# Patient Record
(demographics unavailable — no encounter records)

---

## 2024-11-13 NOTE — HEALTH RISK ASSESSMENT
[Good] : ~his/her~  mood as  good [No] : No [No falls in past year] : Patient reported no falls in the past year [0] : 2) Feeling down, depressed, or hopeless: Not at all (0) [Never] : Never [Patient reported mammogram was normal] : Patient reported mammogram was normal [Patient reported colonoscopy was normal] : Patient reported colonoscopy was normal [Hepatitis C test offered] : Hepatitis C test offered [With Family] : lives with family [Employed] : employed [Feels Safe at Home] : Feels safe at home [Fully functional (bathing, dressing, toileting, transferring, walking, feeding)] : Fully functional (bathing, dressing, toileting, transferring, walking, feeding) [Fully functional (using the telephone, shopping, preparing meals, housekeeping, doing laundry, using] : Fully functional and needs no help or supervision to perform IADLs (using the telephone, shopping, preparing meals, housekeeping, doing laundry, using transportation, managing medications and managing finances) [Patient/Caregiver not ready to engage] : , patient/caregiver not ready to engage [Audit-CScore] : 0 [XRQ7Sxley] : 0 [Reports changes in hearing] : Reports no changes in hearing [Reports changes in vision] : Reports no changes in vision [MammogramDate] : 2023 [MammogramComments] : Normal, per pt  [ColonoscopyDate] : 02/2024 [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

## 2024-11-13 NOTE — COUNSELING
[Fall prevention counseling provided] : Fall prevention counseling provided [Adequate lighting] : Adequate lighting [Behavioral health counseling provided] : Behavioral health counseling provided [Engage in a relaxing activity] : Engage in a relaxing activity [Plan in advance] : Plan in advance

## 2024-11-13 NOTE — INTERPRETER SERVICES
[[employer] ] : provided by [employer]   [Time Spent: ____ minutes] : Total time spent using  services: [unfilled] minutes. The patient's primary language is not English thus required  services. [Interpreters_IDNumber] : [id number]

## 2024-11-13 NOTE — HISTORY OF PRESENT ILLNESS
[FreeTextEntry1] : CPE [de-identified] : 50 yrs old female w/o Phx presenting for annual PE. C/o right lower back pain for 1 month. It comes and goes. pain scale 6/10. Sometimes, it liked a shooting pain. Tylenol helped. Pt has concerns she has kidney problem and would like to have further exam.

## 2024-11-13 NOTE — PHYSICAL EXAM
[Normal] : affect was normal and insight and judgment were intact [de-identified] : R CVA positive ?

## 2024-11-13 NOTE — PLAN
[FreeTextEntry1] : # Gerry PE -CBC, CMP, UA, EKG, lipid panel, hep C - Flu vaccine  # Right lower back pain  Muscular pain Vs. kidney stone  - renal u/s to r/o stone -meloxicam 7.5mg qd PRN for back pain

## 2024-12-20 NOTE — PHYSICAL EXAM
[Normal] : affect was normal and insight and judgment were intact [de-identified] : Tonsile red and swollen

## 2024-12-20 NOTE — HEALTH RISK ASSESSMENT
[No] : No [1 or 2 (0 pts)] : 1 or 2 (0 points) [Never (0 pts)] : Never (0 points) [No falls in past year] : Patient reported no falls in the past year [0] : 2) Feeling down, depressed, or hopeless: Not at all (0) [Patient/Caregiver not ready to engage] : , patient/caregiver not ready to engage [Never] : Never [Audit-CScore] : 0 [[id number]] : 0

## 2024-12-20 NOTE — INTERPRETER SERVICES
[[employer] ] : provided by [employer]   [Time Spent: ____ minutes] : Total time spent using  services: [unfilled] minutes. The patient's primary language is not English thus required  services. [Interpreters_IDNumber] : [id number]

## 2024-12-20 NOTE — REVIEW OF SYSTEMS
[Sore Throat] : sore throat [Negative] : Heme/Lymph [Nasal Discharge] : no nasal discharge [FreeTextEntry4] : clear phlegm

## 2024-12-20 NOTE — HISTORY OF PRESENT ILLNESS
[FreeTextEntry1] : Cough, throat pain for 10 days  [de-identified] : 51 yrs old female w/o Hx presenting to office for cough, throat pain for 10days. Pt had flu vaccine last month. Per pt, she started having cough, throat pain. Denies fever/chill/sob/anyone sick at home. Took Mucinex and Tylenol which did not help with cough. Coming to office for further exam.  - Received flu vaccine last month

## 2024-12-20 NOTE — COUNSELING
[Fall prevention counseling provided] : Fall prevention counseling provided [Adequate lighting] : Adequate lighting [Encouraged to maintain food diary] : Encouraged to maintain food diary [Encouraged to increase physical activity] : Encouraged to increase physical activity [Encouraged to use exercise tracking device] : Encouraged to use exercise tracking device

## 2024-12-20 NOTE — PLAN
[FreeTextEntry1] : # Upper air way infection  - RVP including covid test  - S/p flu vaccine last month  - Doxycycline 100mg qd x7 days  - Bromfed 10ml q4hrs PRN

## 2025-03-14 NOTE — HISTORY OF PRESENT ILLNESS
[FreeTextEntry1] : follow up fatty liver [de-identified] : follow up fatty liver feels some sob no nvd  or palpiations no family history of ashd

## 2025-03-14 NOTE — ASSESSMENT
[FreeTextEntry1] : fatty liver lose weight cpe in November obesity work on diet and weight loss' due for mammogram

## 2025-04-16 NOTE — HISTORY OF PRESENT ILLNESS
[FreeTextEntry1] : right foot pain  [de-identified] : right foot pain  has high arch works standing all day right middle toe hurts at base of foot

## 2025-04-18 NOTE — HISTORY OF PRESENT ILLNESS
[FreeTextEntry1] : TRIXIE  is a 51 year old female seen in the office  Patient is present in the office with pain in the ball of right foot, She states it started about 4 weeks ago, she went to PCP who prescribed anti-inflammatories, she took it for about 2 days got better about 50% Patient denies any injury to the foot. Pain level is 5/10

## 2025-04-18 NOTE — PHYSICAL EXAM
[General Appearance - Alert] : alert [General Appearance - In No Acute Distress] : in no acute distress [Ankle Swelling (On Exam)] : not present [Varicose Veins Of Lower Extremities] : not present [2+] : left foot dorsalis pedis 2+ [] : normal strength/tone [de-identified] : Relative forefoot cavus Mild tenderness submet 2/3. +trell click 2nd interspace with reproducible pain to 2nd and 3rd toes. Mild pain on hyperextension of right 2nd and 3rd toes Right foot bunion noted  [FreeTextEntry1] : There is hyperkeratotic lesion submet 4 of the right foot with tenderness to palpation  There is interdigital maceration 4th interspace bilaterally  [Sensation] : the sensory exam was normal to light touch and pinprick [Oriented To Time, Place, And Person] : oriented to person, place, and time [Impaired Insight] : insight and judgment were intact

## 2025-04-18 NOTE — ASSESSMENT
[FreeTextEntry1] : Right foot submet 2 metatarsalgia discussed alternative diagnosis such as neuroma or plantar plate injury less likely We discussed all conservative treatment NSAIDS, immobilization and pading I advised use of metatarsal pads I advised her to purchase OTC inserts powerstep high arch and educated on benefits Right foot xray obtained 3 views: Increased IM angle, increasd tibial sesamoid position relative short 1st metatarsal. fore foot cavus, no fracture dislocation I advised her to continue meloxicam 15 mg qd eat with food she has noted 50% improvement  I advised rest ice elevation I educated her on supportive shoegear We briefly discussed surgical correction of bunion deformity - defer Advised wide toe box shoes and bunion shield Aseptic debridement performed of callus lesion submet 4 with improvement in pain, continue pumice stone and OTC moisturizer  #Interdigital tinea discussed risk of superimposed bacterial infection discussed etiology condition course and treatment advised daily betadine application to area. keep interspace dry    PTR 3-4 weeks